# Patient Record
Sex: MALE | Race: WHITE | NOT HISPANIC OR LATINO | Employment: FULL TIME | URBAN - METROPOLITAN AREA
[De-identification: names, ages, dates, MRNs, and addresses within clinical notes are randomized per-mention and may not be internally consistent; named-entity substitution may affect disease eponyms.]

---

## 2019-12-01 ENCOUNTER — APPOINTMENT (EMERGENCY)
Dept: CT IMAGING | Facility: HOSPITAL | Age: 49
End: 2019-12-01
Payer: COMMERCIAL

## 2019-12-01 ENCOUNTER — HOSPITAL ENCOUNTER (EMERGENCY)
Facility: HOSPITAL | Age: 49
Discharge: HOME | End: 2019-12-01
Attending: EMERGENCY MEDICINE
Payer: COMMERCIAL

## 2019-12-01 ENCOUNTER — APPOINTMENT (EMERGENCY)
Dept: RADIOLOGY | Facility: HOSPITAL | Age: 49
End: 2019-12-01
Payer: COMMERCIAL

## 2019-12-01 VITALS
HEART RATE: 74 BPM | SYSTOLIC BLOOD PRESSURE: 135 MMHG | TEMPERATURE: 98 F | DIASTOLIC BLOOD PRESSURE: 66 MMHG | WEIGHT: 264.99 LBS | OXYGEN SATURATION: 96 % | RESPIRATION RATE: 16 BRPM

## 2019-12-01 DIAGNOSIS — R10.9 ABDOMINAL PAIN DUE TO INJURY: ICD-10-CM

## 2019-12-01 DIAGNOSIS — R07.9 CHEST PAIN IN ADULT: ICD-10-CM

## 2019-12-01 DIAGNOSIS — R74.01 TRANSAMINITIS: ICD-10-CM
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
## 2019-12-01 LAB
ALBUMIN SERPL BCP-MCNC: 4.1 G/DL (ref 3.5–5)
ALP SERPL-CCNC: 69 U/L (ref 46–116)
ALT SERPL W P-5'-P-CCNC: 86 U/L (ref 12–78)
ANION GAP SERPL CALCULATED.3IONS-SCNC: 6 MMOL/L (ref 4–13)
AST SERPL W P-5'-P-CCNC: 56 U/L (ref 5–45)
BASOPHILS # BLD AUTO: 0.03 THOUSANDS/ΜL (ref 0–0.1)
BASOPHILS NFR BLD AUTO: 1 % (ref 0–1)
BILIRUB SERPL-MCNC: 0.6 MG/DL (ref 0.2–1)
BUN SERPL-MCNC: 16 MG/DL (ref 5–25)
CALCIUM SERPL-MCNC: 9.3 MG/DL (ref 8.3–10.1)
CHLORIDE SERPL-SCNC: 103 MMOL/L (ref 100–108)
CO2 SERPL-SCNC: 28 MMOL/L (ref 21–32)
CREAT SERPL-MCNC: 0.87 MG/DL (ref 0.6–1.3)
EOSINOPHIL # BLD AUTO: 0.11 THOUSAND/ΜL (ref 0–0.61)
EOSINOPHIL NFR BLD AUTO: 2 % (ref 0–6)
ERYTHROCYTE [DISTWIDTH] IN BLOOD BY AUTOMATED COUNT: 11.9 % (ref 11.6–15.1)
GFR SERPL CREATININE-BSD FRML MDRD: 101 ML/MIN/1.73SQ M
GLUCOSE SERPL-MCNC: 133 MG/DL (ref 65–140)
HCT VFR BLD AUTO: 45.2 % (ref 36.5–49.3)
HGB BLD-MCNC: 15.6 G/DL (ref 12–17)
IMM GRANULOCYTES # BLD AUTO: 0.02 THOUSAND/UL (ref 0–0.2)
IMM GRANULOCYTES NFR BLD AUTO: 0 % (ref 0–2)
LIPASE SERPL-CCNC: 130 U/L (ref 73–393)
LYMPHOCYTES # BLD AUTO: 1.83 THOUSANDS/ΜL (ref 0.6–4.47)
LYMPHOCYTES NFR BLD AUTO: 32 % (ref 14–44)
MCH RBC QN AUTO: 31.9 PG (ref 26.8–34.3)
MCHC RBC AUTO-ENTMCNC: 34.5 G/DL (ref 31.4–37.4)
MCV RBC AUTO: 92 FL (ref 82–98)
MONOCYTES # BLD AUTO: 0.5 THOUSAND/ΜL (ref 0.17–1.22)
MONOCYTES NFR BLD AUTO: 9 % (ref 4–12)
NEUTROPHILS # BLD AUTO: 3.27 THOUSANDS/ΜL (ref 1.85–7.62)
NEUTS SEG NFR BLD AUTO: 56 % (ref 43–75)
NRBC BLD AUTO-RTO: 0 /100 WBCS
PLATELET # BLD AUTO: 225 THOUSANDS/UL (ref 149–390)
PMV BLD AUTO: 10.1 FL (ref 8.9–12.7)
POTASSIUM SERPL-SCNC: 3.7 MMOL/L (ref 3.5–5.3)
PROT SERPL-MCNC: 7.3 G/DL (ref 6.4–8.2)
RBC # BLD AUTO: 4.89 MILLION/UL (ref 3.88–5.62)
SODIUM SERPL-SCNC: 137 MMOL/L (ref 136–145)
TROPONIN I SERPL-MCNC: <0.02 NG/ML
WBC # BLD AUTO: 5.76 THOUSAND/UL (ref 4.31–10.16)

## 2019-12-01 PROCEDURE — 93005 ELECTROCARDIOGRAM TRACING: CPT

## 2019-12-01 PROCEDURE — 71260 CT THORAX DX C+: CPT

## 2019-12-01 PROCEDURE — 71046 X-RAY EXAM CHEST 2 VIEWS: CPT

## 2019-12-01 PROCEDURE — 80053 COMPREHEN METABOLIC PANEL: CPT | Performed by: EMERGENCY MEDICINE

## 2019-12-01 PROCEDURE — 74177 CT ABD & PELVIS W/CONTRAST: CPT

## 2019-12-01 PROCEDURE — 36415 COLL VENOUS BLD VENIPUNCTURE: CPT | Performed by: EMERGENCY MEDICINE

## 2019-12-01 PROCEDURE — 83690 ASSAY OF LIPASE: CPT | Performed by: EMERGENCY MEDICINE

## 2019-12-01 PROCEDURE — 85025 COMPLETE CBC W/AUTO DIFF WBC: CPT | Performed by: EMERGENCY MEDICINE

## 2019-12-01 PROCEDURE — 99285 EMERGENCY DEPT VISIT HI MDM: CPT | Performed by: EMERGENCY MEDICINE

## 2019-12-01 PROCEDURE — 84484 ASSAY OF TROPONIN QUANT: CPT | Performed by: EMERGENCY MEDICINE

## 2019-12-01 PROCEDURE — 99285 EMERGENCY DEPT VISIT HI MDM: CPT

## 2019-12-01 RX ADMIN — IOHEXOL 100 ML: 350 INJECTION, SOLUTION INTRAVENOUS at 17:51
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
## 2019-12-04 LAB
ATRIAL RATE: 72 BPM
P AXIS: 17 DEGREES
PR INTERVAL: 184 MS
QRS AXIS: -3 DEGREES
QRSD INTERVAL: 96 MS
QT INTERVAL: 392 MS
QTC INTERVAL: 420 MS
T WAVE AXIS: 93 DEGREES
VENTRICULAR RATE: 69 BPM

## 2019-12-04 PROCEDURE — 93010 ELECTROCARDIOGRAM REPORT: CPT | Performed by: INTERNAL MEDICINE

## 2020-08-05 ENCOUNTER — HOSPITAL ENCOUNTER (EMERGENCY)
Facility: HOSPITAL | Age: 50
Discharge: HOME | End: 2020-08-05
Attending: EMERGENCY MEDICINE
Payer: COMMERCIAL

## 2020-08-05 VITALS
SYSTOLIC BLOOD PRESSURE: 125 MMHG | HEART RATE: 82 BPM | RESPIRATION RATE: 18 BRPM | OXYGEN SATURATION: 95 % | WEIGHT: 245.37 LBS | TEMPERATURE: 98.5 F | DIASTOLIC BLOOD PRESSURE: 67 MMHG

## 2020-08-05 DIAGNOSIS — R11.0 NAUSEA: ICD-10-CM

## 2020-08-05 DIAGNOSIS — R53.1 GENERALIZED WEAKNESS: ICD-10-CM

## 2020-08-05 DIAGNOSIS — R10.84 GENERALIZED ABDOMINAL PAIN: Primary | ICD-10-CM

## 2020-08-05 DIAGNOSIS — R73.9 HYPERGLYCEMIA: ICD-10-CM

## 2020-08-05 LAB
ALBUMIN SERPL BCP-MCNC: 3.6 G/DL (ref 3.5–5)
ALP SERPL-CCNC: 87 U/L (ref 46–116)
ALT SERPL W P-5'-P-CCNC: 87 U/L (ref 12–78)
ANION GAP SERPL CALCULATED.3IONS-SCNC: 7 MMOL/L (ref 4–13)
AST SERPL W P-5'-P-CCNC: 61 U/L (ref 5–45)
BASOPHILS # BLD AUTO: 0.01 THOUSANDS/ΜL (ref 0–0.1)
BASOPHILS NFR BLD AUTO: 0 % (ref 0–1)
BILIRUB SERPL-MCNC: 0.66 MG/DL (ref 0.2–1)
BUN SERPL-MCNC: 13 MG/DL (ref 5–25)
CALCIUM SERPL-MCNC: 8.5 MG/DL (ref 8.3–10.1)
CHLORIDE SERPL-SCNC: 95 MMOL/L (ref 100–108)
CO2 SERPL-SCNC: 28 MMOL/L (ref 21–32)
CREAT SERPL-MCNC: 1.02 MG/DL (ref 0.6–1.3)
EOSINOPHIL # BLD AUTO: 0.01 THOUSAND/ΜL (ref 0–0.61)
EOSINOPHIL NFR BLD AUTO: 0 % (ref 0–6)
ERYTHROCYTE [DISTWIDTH] IN BLOOD BY AUTOMATED COUNT: 12.4 % (ref 11.6–15.1)
GFR SERPL CREATININE-BSD FRML MDRD: 85 ML/MIN/1.73SQ M
GLUCOSE SERPL-MCNC: 354 MG/DL (ref 65–140)
HCT VFR BLD AUTO: 47.3 % (ref 36.5–49.3)
HGB BLD-MCNC: 16 G/DL (ref 12–17)
IMM GRANULOCYTES # BLD AUTO: 0 THOUSAND/UL (ref 0–0.2)
IMM GRANULOCYTES NFR BLD AUTO: 0 % (ref 0–2)
LIPASE SERPL-CCNC: 133 U/L (ref 73–393)
LYMPHOCYTES # BLD AUTO: 1.01 THOUSANDS/ΜL (ref 0.6–4.47)
LYMPHOCYTES NFR BLD AUTO: 29 % (ref 14–44)
MCH RBC QN AUTO: 31.3 PG (ref 26.8–34.3)
MCHC RBC AUTO-ENTMCNC: 33.8 G/DL (ref 31.4–37.4)
MCV RBC AUTO: 93 FL (ref 82–98)
MONOCYTES # BLD AUTO: 0.25 THOUSAND/ΜL (ref 0.17–1.22)
MONOCYTES NFR BLD AUTO: 7 % (ref 4–12)
NEUTROPHILS # BLD AUTO: 2.16 THOUSANDS/ΜL (ref 1.85–7.62)
NEUTS SEG NFR BLD AUTO: 64 % (ref 43–75)
NRBC BLD AUTO-RTO: 0 /100 WBCS
PLATELET # BLD AUTO: 154 THOUSANDS/UL (ref 149–390)
PMV BLD AUTO: 10.2 FL (ref 8.9–12.7)
POTASSIUM SERPL-SCNC: 4.6 MMOL/L (ref 3.5–5.3)
PROT SERPL-MCNC: 7.3 G/DL (ref 6.4–8.2)
RBC # BLD AUTO: 5.11 MILLION/UL (ref 3.88–5.62)
SODIUM SERPL-SCNC: 130 MMOL/L (ref 136–145)
WBC # BLD AUTO: 3.44 THOUSAND/UL (ref 4.31–10.16)

## 2020-08-05 PROCEDURE — 99285 EMERGENCY DEPT VISIT HI MDM: CPT

## 2020-08-05 PROCEDURE — 85025 COMPLETE CBC W/AUTO DIFF WBC: CPT | Performed by: EMERGENCY MEDICINE

## 2020-08-05 PROCEDURE — 99284 EMERGENCY DEPT VISIT MOD MDM: CPT | Performed by: EMERGENCY MEDICINE

## 2020-08-05 PROCEDURE — 36415 COLL VENOUS BLD VENIPUNCTURE: CPT | Performed by: EMERGENCY MEDICINE

## 2020-08-05 PROCEDURE — 83690 ASSAY OF LIPASE: CPT | Performed by: EMERGENCY MEDICINE

## 2020-08-05 PROCEDURE — 96374 THER/PROPH/DIAG INJ IV PUSH: CPT

## 2020-08-05 PROCEDURE — 96361 HYDRATE IV INFUSION ADD-ON: CPT

## 2020-08-05 PROCEDURE — 80053 COMPREHEN METABOLIC PANEL: CPT | Performed by: EMERGENCY MEDICINE

## 2020-08-05 RX ORDER — DICYCLOMINE HCL 20 MG
20 TABLET ORAL 3 TIMES DAILY PRN
Qty: 20 TABLET | Refills: 0 | Status: SHIPPED | OUTPATIENT
Start: 2020-08-05

## 2020-08-05 RX ORDER — ONDANSETRON 2 MG/ML
4 INJECTION INTRAMUSCULAR; INTRAVENOUS ONCE
Status: COMPLETED | OUTPATIENT
Start: 2020-08-05 | End: 2020-08-05

## 2020-08-05 RX ORDER — ONDANSETRON 4 MG/1
4 TABLET, ORALLY DISINTEGRATING ORAL EVERY 6 HOURS PRN
Qty: 20 TABLET | Refills: 0 | Status: SHIPPED | OUTPATIENT
Start: 2020-08-05

## 2020-08-05 RX ORDER — DICYCLOMINE HCL 20 MG
20 TABLET ORAL ONCE
Status: COMPLETED | OUTPATIENT
Start: 2020-08-05 | End: 2020-08-05

## 2020-08-05 RX ADMIN — SODIUM CHLORIDE 1000 ML: 0.9 INJECTION, SOLUTION INTRAVENOUS at 11:05

## 2020-08-05 RX ADMIN — DICYCLOMINE HYDROCHLORIDE 20 MG: 20 TABLET ORAL at 11:08

## 2020-08-05 RX ADMIN — ONDANSETRON 4 MG: 2 INJECTION INTRAMUSCULAR; INTRAVENOUS at 11:07

## 2020-08-05 NOTE — ED PROVIDER NOTES
History  Chief Complaint   Patient presents with    Weakness - Generalized     pt reports generalized weakness, feeling drained and tired with minimal activity  reports that his stomach feels "curdly" Pt reports 2 loose stools per day and nausea  moist cough noted  has sweats, denies fevers     Patient is a 68-year-old male with a history of diabetes who presents with abdominal pain  Patient states that he has had generalized abdominal cramping for 1 week  He describes associated nausea and generalized weakness  He has had a couple of loose stools but denies diarrhea  He also denies fevers, chills, shortness of breath, chest pain or other complaints  He was around a co-worker with similar symptoms and believes he caught a stomach bug  Patient has not been taking any medications at home  His symptoms have persisted for 1 week which prompted his visit to the emergency department today  History provided by:  Patient  Abdominal Pain   Pain location:  Generalized  Pain quality: cramping    Pain radiates to:  Does not radiate  Duration:  1 week  Progression:  Unchanged  Chronicity:  New  Ineffective treatments:  None tried  Associated symptoms: nausea    Associated symptoms: no chest pain, no chills, no constipation, no cough, no diarrhea, no dysuria, no fever, no hematuria, no shortness of breath, no sore throat and no vomiting        Prior to Admission Medications   Prescriptions Last Dose Informant Patient Reported? Taking? METFORMIN HCL PO   Yes No   Sig: Take by mouth   aspirin 81 MG tablet   Yes No   Sig: Take 325 mg by mouth daily      Facility-Administered Medications: None       Past Medical History:   Diagnosis Date    DM (diabetes mellitus), type 2 ([facility] [address] )        Past Surgical History:   Procedure Laterality Date    CARDIAC SURGERY         History reviewed  No pertinent family history  I have reviewed and agree with the history as documented      E-Cigarette/Vaping     E-Cigarette/Vaping Substances     Social History     Tobacco Use    Smoking status: Never Smoker    Smokeless tobacco: Never Used   Substance Use Topics    Alcohol use: Never     Frequency: Never    Drug use: Never       Review of Systems   Constitutional: Negative for chills, diaphoresis and fever  HENT: Negative for nosebleeds, sore throat and trouble swallowing  Eyes: Negative for photophobia, pain and visual disturbance  Respiratory: Negative for cough, chest tightness and shortness of breath  Cardiovascular: Negative for chest pain, palpitations and leg swelling  Gastrointestinal: Positive for abdominal pain and nausea  Negative for constipation, diarrhea and vomiting  Endocrine: Negative for polydipsia and polyuria  Genitourinary: Negative for difficulty urinating, dysuria and hematuria  Musculoskeletal: Negative for back pain, neck pain and neck stiffness  Skin: Negative for pallor and rash  Neurological: Negative for dizziness, syncope, light-headedness and headaches  All other systems reviewed and are negative  Physical Exam  Physical Exam  Vitals signs and nursing note reviewed  Constitutional:       General: He is not in acute distress  Appearance: He is well-developed  HENT:      Head: Normocephalic and atraumatic  Eyes:      Pupils: Pupils are equal, round, and reactive to light  Neck:      Musculoskeletal: Normal range of motion and neck supple  Cardiovascular:      Rate and Rhythm: Normal rate and regular rhythm  Pulses: Normal pulses  Heart sounds: Normal heart sounds  Pulmonary:      Effort: Pulmonary effort is normal  No respiratory distress  Breath sounds: Normal breath sounds  Abdominal:      General: There is no distension  Palpations: Abdomen is soft  Abdomen is not rigid  Tenderness: There is no abdominal tenderness  There is no guarding or rebound  Musculoskeletal: Normal range of motion  General: No tenderness  Lymphadenopathy:      Cervical: No cervical adenopathy  Skin:     General: Skin is warm and dry  Capillary Refill: Capillary refill takes less than 2 seconds  Neurological:      Mental Status: He is alert and oriented to person, place, and time  Cranial Nerves: No cranial nerve deficit  Sensory: No sensory deficit           Vital Signs  ED Triage Vitals [08/05/20 1041]   Temperature Pulse Respirations Blood Pressure SpO2   98 5 °F (36 9 °C) 82 18 125/67 95 %      Temp Source Heart Rate Source Patient Position - Orthostatic VS BP Location FiO2 (%)   Oral Monitor -- Right arm --      Pain Score       --           Vitals:    08/05/20 1041   BP: 125/67   Pulse: 82         Visual Acuity      ED Medications  Medications   sodium chloride 0 9 % bolus 1,000 mL (0 mL Intravenous Stopped 8/5/20 1320)   ondansetron (ZOFRAN) injection 4 mg (4 mg Intravenous Given 8/5/20 1107)   dicyclomine (BENTYL) tablet 20 mg (20 mg Oral Given 8/5/20 1108)       Diagnostic Studies  Results Reviewed     Procedure Component Value Units Date/Time    Comprehensive metabolic panel [[id number]]  (Abnormal) Collected:  08/05/20 1104    Lab Status:  Final result Specimen:  Blood from Arm, Right Updated:  08/05/20 1129     Sodium 130 mmol/L      Potassium 4 6 mmol/L      Chloride 95 mmol/L      CO2 28 mmol/L      ANION GAP 7 mmol/L      BUN 13 mg/dL      Creatinine 1 02 mg/dL      Glucose 354 mg/dL      Calcium 8 5 mg/dL      AST 61 U/L      ALT 87 U/L      Alkaline Phosphatase 87 U/L      Total Protein 7 3 g/dL      Albumin 3 6 g/dL      Total Bilirubin 0 66 mg/dL      eGFR 85 ml/min/1 73sq m     Narrative:       [place] guidelines for Chronic Kidney Disease (CKD):     Stage 1 with normal or high GFR (GFR > 90 mL/min/1 73 square meters)    Stage 2 Mild CKD (GFR = 60-89 mL/min/1 73 square meters)    Stage 3A Moderate CKD (GFR = 45-59 mL/min/1 73 square meters)    Stage 3B Moderate CKD (GFR = 30-44 mL/min/1 73 square meters)    Stage 4 Severe CKD (GFR = 15-29 mL/min/1 73 square meters)    Stage 5 End Stage CKD (GFR <15 mL/min/1 73 square meters)  Note: GFR calculation is accurate only with a steady state creatinine    Lipase [[id number]]  (Normal) Collected:  08/05/20 1104    Lab Status:  Final result Specimen:  Blood from Arm, Right Updated:  08/05/20 1129     Lipase 133 u/L     CBC and differential [[id number]]  (Abnormal) Collected:  08/05/20 1104    Lab Status:  Final result Specimen:  Blood from Arm, Right Updated:  08/05/20 1115     WBC 3 44 Thousand/uL      RBC 5 11 Million/uL      Hemoglobin 16 0 g/dL      Hematocrit 47 3 %      MCV 93 fL      MCH 31 3 pg      MCHC 33 8 g/dL      RDW 12 4 %      MPV 10 2 fL      Platelets 650 Thousands/uL      nRBC 0 /100 WBCs      Neutrophils Relative 64 %      Immat GRANS % 0 %      Lymphocytes Relative 29 %      Monocytes Relative 7 %      Eosinophils Relative 0 %      Basophils Relative 0 %      Neutrophils Absolute 2 16 Thousands/µL      Immature Grans Absolute 0 00 Thousand/uL      Lymphocytes Absolute 1 01 Thousands/µL      Monocytes Absolute 0 25 Thousand/µL      Eosinophils Absolute 0 01 Thousand/µL      Basophils Absolute 0 01 Thousands/µL                  No orders to display              Procedures  Procedures         ED Course  ED Course as of Aug 05 1504   Wed Aug 05, 2020   1132 Transaminitis similar to previous  Patient has hyperglycemia without evidence of DKA  1145 Patient feels well  Will reassess  US AUDIT      Most Recent Value   Initial Alcohol Screen: US AUDIT-C    1  How often do you have a drink containing alcohol?  0 Filed at: 08/05/2020 1109   2  How many drinks containing alcohol do you have on a typical day you are drinking? 0 Filed at: 08/05/2020 1109   3a  Male UNDER 65: How often do you have five or more drinks on one occasion? 0 Filed at: 08/05/2020 1109   3b  FEMALE Any Age, or MALE 65+:  How often do you have 4 or more drinks on one occassion? 0 Filed at: 08/05/2020 1109   Audit-C Score  0 Filed at: 08/05/2020 1109                  ELIE/DAST-10      Most Recent Value   How many times in the past year have you    Used an illegal drug or used a prescription medication for non-medical reasons? Never Filed at: 08/05/2020 1109                                MDM  Number of Diagnoses or Management Options  Generalized abdominal pain: new and requires workup  Generalized weakness: new and requires workup  Hyperglycemia: new and requires workup  Nausea: new and requires workup  Diagnosis management comments: Patient presents with generalized abdominal discomfort, nausea and weakness  Abdominal exam benign  Possible gastroenteritis given nausea and loose stools  No respiratory symptoms  Patient is afebrile  Patient is very well-appearing  Do not suspect acute surgical process including but not limited to acute cholecystitis, acute appendicitis, SBO, mesenteric ischemia, AAA, vascular dissection, vascular occlusion, perforated viscus, testicular torsion  Do not suspect intrathoracic cause of abdominal pain  Symptoms improved and patient is tolerating po  Patient advised to return to ED if symptoms worsen or persist  Patient also advised to follow up with PCP          Amount and/or Complexity of Data Reviewed  Clinical lab tests: ordered and reviewed  Tests in the medicine section of CPT®: ordered and reviewed  Review and summarize past medical records: yes  Independent visualization of images, tracings, or specimens: yes    Risk of Complications, Morbidity, and/or Mortality  Presenting problems: high  Diagnostic procedures: moderate  Management options: moderate    Patient Progress  Patient progress: stable        Disposition  Final diagnoses:   Generalized abdominal pain   Nausea   Generalized weakness   Hyperglycemia     Time reflects when diagnosis was documented in both MDM as applicable and the Disposition within this note Time User Action Codes Description Comment    8/5/2020  1:12 PM Adrienne Gone Add [R10 84] Generalized abdominal pain     8/5/2020  1:12 PM Eluterio Dukes L Add [R11 0] Nausea     8/5/2020  1:12 PM Eluterio Dukes L Add [R53 1] Generalized weakness     8/5/2020  1:12 PM Adrienne Gone Add [R73 9] Hyperglycemia       ED Disposition     ED Disposition Condition Date/Time Comment    Discharge Stable Wed Aug 5, 2020  1:12 PM Tyra Willian discharge to home/self care  Follow-up Information     Follow up With Specialties Details Why Contact Info    [employer]  Schedule an appointment as soon as possible for a visit  Return to ED sooner if symptoms worsen or persist  [phone]            Discharge Medication List as of 8/5/2020  1:14 PM      START taking these medications    Details   dicyclomine (BENTYL) 20 mg tablet Take 1 tablet (20 mg total) by mouth 3 (three) times a day as needed (Abdominal cramping), Starting Wed 8/5/2020, Normal      ondansetron (ZOFRAN-ODT) 4 mg disintegrating tablet Take 1 tablet (4 mg total) by mouth every 6 (six) hours as needed for nausea or vomiting, Starting Wed 8/5/2020, Normal         CONTINUE these medications which have NOT CHANGED    Details   aspirin 81 MG tablet Take 325 mg by mouth daily, Historical Med      METFORMIN HCL PO Take by mouth, Historical Med           No discharge procedures on file      PDMP Review     None          ED Provider  Electronically Signed by           Huang Esquivel DO  08/05/20 3669

## 2021-06-14 ENCOUNTER — APPOINTMENT (EMERGENCY)
Dept: RADIOLOGY | Facility: HOSPITAL | Age: 51
End: 2021-06-14

## 2021-06-14 ENCOUNTER — HOSPITAL ENCOUNTER (EMERGENCY)
Facility: HOSPITAL | Age: 51
Discharge: HOME | End: 2021-06-14
Attending: EMERGENCY MEDICINE

## 2021-06-14 VITALS
OXYGEN SATURATION: 98 % | SYSTOLIC BLOOD PRESSURE: 145 MMHG | DIASTOLIC BLOOD PRESSURE: 66 MMHG | HEART RATE: 69 BPM | RESPIRATION RATE: 18 BRPM | TEMPERATURE: 98.7 F

## 2021-06-14 DIAGNOSIS — M54.32 SCIATICA OF LEFT SIDE: Primary | ICD-10-CM

## 2021-06-14 PROCEDURE — 99284 EMERGENCY DEPT VISIT MOD MDM: CPT | Performed by: PHYSICIAN ASSISTANT

## 2021-06-14 PROCEDURE — 99283 EMERGENCY DEPT VISIT LOW MDM: CPT

## 2021-06-14 PROCEDURE — 72100 X-RAY EXAM L-S SPINE 2/3 VWS: CPT

## 2021-06-14 RX ORDER — LIDOCAINE 50 MG/G
1 PATCH TOPICAL ONCE
Status: DISCONTINUED | OUTPATIENT
Start: 2021-06-14 | End: 2021-06-14 | Stop reason: HOSPADM

## 2021-06-14 RX ORDER — CYCLOBENZAPRINE HCL 10 MG
10 TABLET ORAL 2 TIMES DAILY PRN
Qty: 20 TABLET | Refills: 0 | Status: SHIPPED | OUTPATIENT
Start: 2021-06-14

## 2021-06-14 RX ADMIN — LIDOCAINE 5% 1 PATCH: 700 PATCH TOPICAL at 11:40

## 2021-06-14 NOTE — ED PROVIDER NOTES
History  Chief Complaint   Patient presents with    Back Pain     pt injured his back 8 days ago lifting something heavy     Patient states that he works for [employer], states that he was lifting and twisting and states that has had low back pain radiating down the left leg to the left knee  No weakness  Improvement with ibuprofen but then returns  No falls  No previous injury/surgery noted  Back Pain  Location:  Lumbar spine  Quality:  Aching  Radiates to:  L knee  Pain severity:  Moderate  Pain is:  Same all the time  Onset quality:  Gradual  Duration:  8 days  Timing:  Intermittent  Progression:  Waxing and waning  Chronicity:  New  Context: lifting heavy objects and twisting    Relieved by:  Ibuprofen  Exacerbated by: staying in one place for a long period of time  Ineffective treatments:  Being still  Associated symptoms: no abdominal pain, no bladder incontinence, no bowel incontinence, no chest pain, no dysuria, no fever, no numbness, no paresthesias, no perianal numbness, no tingling and no weakness    Risk factors: obesity        Prior to Admission Medications   Prescriptions Last Dose Informant Patient Reported? Taking? METFORMIN HCL PO   Yes No   Sig: Take by mouth   aspirin 81 MG tablet   Yes No   Sig: Take 325 mg by mouth daily   dicyclomine (BENTYL) 20 mg tablet   No No   Sig: Take 1 tablet (20 mg total) by mouth 3 (three) times a day as needed (Abdominal cramping)   ondansetron (ZOFRAN-ODT) 4 mg disintegrating tablet   No No   Sig: Take 1 tablet (4 mg total) by mouth every 6 (six) hours as needed for nausea or vomiting      Facility-Administered Medications: None       Past Medical History:   Diagnosis Date    DM (diabetes mellitus), type 2 ([facility] [address] )        Past Surgical History:   Procedure Laterality Date    CARDIAC SURGERY         History reviewed  No pertinent family history  I have reviewed and agree with the history as documented      E-Cigarette/Vaping    E-Cigarette Use Never User E-Cigarette/Vaping Substances     Social History     Tobacco Use    Smoking status: Never Smoker    Smokeless tobacco: Never Used   Vaping Use    Vaping Use: Never used   Substance Use Topics    Alcohol use: Never    Drug use: Never       Review of Systems   Constitutional: Negative for chills and fever  HENT: Negative for ear pain and sore throat  Eyes: Negative for pain and visual disturbance  Respiratory: Negative for cough and shortness of breath  Cardiovascular: Negative for chest pain and palpitations  Gastrointestinal: Negative for abdominal pain, bowel incontinence and vomiting  Genitourinary: Negative for bladder incontinence, dysuria and hematuria  Musculoskeletal: Positive for back pain  Negative for arthralgias  Skin: Negative for color change and rash  Neurological: Negative for tingling, seizures, syncope, weakness, numbness and paresthesias  All other systems reviewed and are negative  Physical Exam  Physical Exam  Vitals and nursing note reviewed  Constitutional:       Appearance: Normal appearance  Cardiovascular:      Rate and Rhythm: Normal rate and regular rhythm  Pulses: Normal pulses  Heart sounds: Normal heart sounds  Pulmonary:      Breath sounds: Normal breath sounds  Abdominal:      General: Abdomen is flat  There is no distension  Palpations: Abdomen is soft  Tenderness: There is no abdominal tenderness  Musculoskeletal:         General: Tenderness (left paracervical) present  Normal range of motion  Skin:     General: Skin is warm and dry  Neurological:      General: No focal deficit present  Mental Status: He is alert and oriented to person, place, and time  Sensory: No sensory deficit        Coordination: Coordination normal       Gait: Gait normal       Deep Tendon Reflexes: Reflexes normal          Vital Signs  ED Triage Vitals   Temperature Pulse Respirations Blood Pressure SpO2   06/14/21 1108 06/14/21 1107 06/14/21 1107 06/14/21 1107 06/14/21 1107   98 7 °F (37 1 °C) 69 18 145/66 98 %      Temp Source Heart Rate Source Patient Position - Orthostatic VS BP Location FiO2 (%)   06/14/21 1108 06/14/21 1107 06/14/21 1107 06/14/21 1107 --   Oral Monitor Sitting Left arm       Pain Score       06/14/21 1107       No Pain           Vitals:    06/14/21 1107   BP: 145/66   Pulse: 69   Patient Position - Orthostatic VS: Sitting         Visual Acuity      ED Medications  Medications   lidocaine (LIDODERM) 5 % patch 1 patch (1 patch Topical Medication Applied 6/14/21 1140)       Diagnostic Studies  Results Reviewed     None                 XR spine lumbar 2 or 3 views injury   ED Interpretation by Kusum Chapin PA-C (06/14 2403)   No acute abnormalities noted                 Procedures  Procedures         ED Course                                           MDM  Number of Diagnoses or Management Options  Sciatica of left side  Diagnosis management comments: Differential diagnosis includes but is not limited to: sprain, strain, herniation, sciatica    The patient was seen and examined    Imaging revealed no evidence of acute injury  The patient was treated with lidoderm patch in the ED  The patient was re-examined after treatment and disposition of discharge to home with cyclobenzaprine was made  The test results were discussed with the patient/family  All questions were answered to patient/family's satisfaction  Anticipatory guidance and return precautions were discussed at length  They verbalized understanding and agreement with the plan  The patient remained stable while under my care in the Emergency Department              Amount and/or Complexity of Data Reviewed  Tests in the radiology section of CPT®: ordered and reviewed  Independent visualization of images, tracings, or specimens: yes    Risk of Complications, Morbidity, and/or Mortality  Presenting problems: moderate  Diagnostic procedures: low  Management options: low    Patient Progress  Patient progress: improved      Disposition  Final diagnoses:   Sciatica of left side     Time reflects when diagnosis was documented in both MDM as applicable and the Disposition within this note     Time User Action Codes Description Comment    6/14/2021 12:43 PM Yadi Markham Add [M54 [id number]] Sciatica of left side       ED Disposition     ED Disposition Condition Date/Time Comment    Discharge Stable Mon Jun 14, 2021 12:43 PM Yelenajohana Cas discharge to home/self care  Follow-up Information     Follow up With Specialties Details Why Contact Info Additional Information    [address] [facility] Emergency Medicine  If symptoms worsen [address] [facility] [address] [facility] Emergency Department, [address], [place], [place], [address]          Discharge Medication List as of 6/14/2021 12:50 PM      START taking these medications    Details   cyclobenzaprine (FLEXERIL) 10 mg tablet Take 1 tablet (10 mg total) by mouth 2 (two) times a day as needed for muscle spasms, Starting Mon 6/14/2021, Normal         CONTINUE these medications which have NOT CHANGED    Details   aspirin 81 MG tablet Take 325 mg by mouth daily, Historical Med      dicyclomine (BENTYL) 20 mg tablet Take 1 tablet (20 mg total) by mouth 3 (three) times a day as needed (Abdominal cramping), Starting Wed 8/5/2020, Normal      METFORMIN HCL PO Take by mouth, Historical Med      ondansetron (ZOFRAN-ODT) 4 mg disintegrating tablet Take 1 tablet (4 mg total) by mouth every 6 (six) hours as needed for nausea or vomiting, Starting Wed 8/5/2020, Normal           No discharge procedures on file      PDMP Review     None          ED Provider  Electronically Signed by           Shira White PA-C  06/14/21 1932

## 2021-06-14 NOTE — DISCHARGE INSTRUCTIONS
XR spine lumbar 2 or 3 views injury   ED Interpretation   No acute abnormalities noted        Continue using motrin as needed with food in addition to muscle relaxer   Heat and massage as tolerated

## 2021-06-14 NOTE — ED NOTES
Patient reports injuring back approximately 1 week ago  Motrin helps, but is not enough  Patient reports taking up to 5 pills at a time for pain so he can continue to work        Vicky Wynn RN  06/14/21 0629

## 2021-11-28 ENCOUNTER — APPOINTMENT (EMERGENCY)
Dept: RADIOLOGY | Facility: HOSPITAL | Age: 51
End: 2021-11-28
Payer: OTHER MISCELLANEOUS

## 2021-11-28 ENCOUNTER — HOSPITAL ENCOUNTER (EMERGENCY)
Facility: HOSPITAL | Age: 51
Discharge: HOME | End: 2021-11-28
Attending: EMERGENCY MEDICINE | Admitting: EMERGENCY MEDICINE
Payer: OTHER MISCELLANEOUS

## 2021-11-28 VITALS
WEIGHT: 250 LBS | RESPIRATION RATE: 16 BRPM | HEART RATE: 83 BPM | HEIGHT: 68 IN | DIASTOLIC BLOOD PRESSURE: 64 MMHG | BODY MASS INDEX: 37.89 KG/M2 | SYSTOLIC BLOOD PRESSURE: 138 MMHG | TEMPERATURE: 98.5 F | OXYGEN SATURATION: 95 %

## 2021-11-28 DIAGNOSIS — S52.291B: Primary | ICD-10-CM

## 2021-11-28 PROCEDURE — 73090 X-RAY EXAM OF FOREARM: CPT

## 2021-11-28 PROCEDURE — 99283 EMERGENCY DEPT VISIT LOW MDM: CPT

## 2021-11-28 PROCEDURE — 99283 EMERGENCY DEPT VISIT LOW MDM: CPT | Performed by: EMERGENCY MEDICINE

## 2021-12-02 ENCOUNTER — HOSPITAL ENCOUNTER (OUTPATIENT)
Dept: MRI IMAGING | Facility: HOSPITAL | Age: 51
Discharge: HOME | End: 2021-12-02
Payer: OTHER MISCELLANEOUS

## 2021-12-02 ENCOUNTER — OFFICE VISIT (OUTPATIENT)
Dept: OBGYN CLINIC | Facility: CLINIC | Age: 51
End: 2021-12-02
Payer: OTHER MISCELLANEOUS

## 2021-12-02 VITALS — BODY MASS INDEX: 41.28 KG/M2 | HEIGHT: 68 IN | WEIGHT: 272.4 LBS

## 2021-12-02 DIAGNOSIS — M25.521 PAIN IN RIGHT ELBOW: ICD-10-CM

## 2021-12-02 DIAGNOSIS — M25.521 PAIN IN RIGHT ELBOW: Primary | ICD-10-CM

## 2021-12-02 DIAGNOSIS — S46.209A INJURY OF TENDON OF BICEPS: ICD-10-CM

## 2021-12-02 DIAGNOSIS — F40.240 CLAUSTROPHOBIA: ICD-10-CM

## 2021-12-02 PROCEDURE — 99203 OFFICE O/P NEW LOW 30 MIN: CPT | Performed by: PHYSICIAN ASSISTANT

## 2021-12-02 PROCEDURE — 73221 MRI JOINT UPR EXTREM W/O DYE: CPT

## 2021-12-02 PROCEDURE — G1004 CDSM NDSC: HCPCS

## 2021-12-02 RX ORDER — LORAZEPAM 0.5 MG/1
0.5 TABLET ORAL ONCE
Qty: 2 TABLET | Refills: 0 | Status: SHIPPED | OUTPATIENT
Start: 2021-12-02 | End: 2021-12-02

## 2021-12-09 ENCOUNTER — OFFICE VISIT (OUTPATIENT)
Dept: OBGYN CLINIC | Facility: OTHER | Age: 51
End: 2021-12-09
Payer: OTHER MISCELLANEOUS

## 2021-12-09 VITALS
BODY MASS INDEX: 41.22 KG/M2 | WEIGHT: 272 LBS | HEART RATE: 86 BPM | SYSTOLIC BLOOD PRESSURE: 156 MMHG | HEIGHT: 68 IN | DIASTOLIC BLOOD PRESSURE: 90 MMHG

## 2021-12-09 DIAGNOSIS — S46.211A STRAIN OF RIGHT BICEPS, INITIAL ENCOUNTER: Primary | ICD-10-CM

## 2021-12-09 DIAGNOSIS — S56.519A STRAIN OF EXTENSOR MUSCLE AT FOREARM LEVEL: ICD-10-CM

## 2021-12-09 PROCEDURE — 99214 OFFICE O/P EST MOD 30 MIN: CPT | Performed by: ORTHOPAEDIC SURGERY

## 2022-02-20 ENCOUNTER — HOSPITAL ENCOUNTER (EMERGENCY)
Facility: HOSPITAL | Age: 52
Discharge: HOME | End: 2022-02-20
Attending: EMERGENCY MEDICINE | Admitting: EMERGENCY MEDICINE
Payer: COMMERCIAL

## 2022-02-20 VITALS
HEART RATE: 83 BPM | TEMPERATURE: 100.2 F | SYSTOLIC BLOOD PRESSURE: 180 MMHG | OXYGEN SATURATION: 98 % | RESPIRATION RATE: 20 BRPM | DIASTOLIC BLOOD PRESSURE: 78 MMHG

## 2022-02-20 DIAGNOSIS — L03.114 CELLULITIS OF LEFT UPPER EXTREMITY: Primary | ICD-10-CM

## 2022-02-20 PROCEDURE — 99284 EMERGENCY DEPT VISIT MOD MDM: CPT | Performed by: EMERGENCY MEDICINE

## 2022-02-20 PROCEDURE — 99282 EMERGENCY DEPT VISIT SF MDM: CPT

## 2022-02-20 RX ORDER — CLINDAMYCIN HYDROCHLORIDE 150 MG/1
450 CAPSULE ORAL 3 TIMES DAILY
Qty: 89 CAPSULE | Refills: 0 | Status: SHIPPED | OUTPATIENT
Start: 2022-02-20 | End: 2022-03-02

## 2022-02-20 RX ORDER — CLINDAMYCIN HYDROCHLORIDE 150 MG/1
450 CAPSULE ORAL ONCE
Status: COMPLETED | OUTPATIENT
Start: 2022-02-20 | End: 2022-02-20

## 2022-02-20 RX ADMIN — CLINDAMYCIN HYDROCHLORIDE 450 MG: 150 CAPSULE ORAL at 15:22

## 2022-02-20 NOTE — ED PROVIDER NOTES
History  Chief Complaint   Patient presents with    Wound Check     Pt reports L elbow boil for 2 weeks that has become worse  - fever, +drainge, +edema      Ki Wharton is a 78-year-old male with PMH of CHF, DM, quadruple bypass surgery 7 years ago, hypertension that presents the ED with 2 week history of left-sided forearm boil that has been worsening  Patient states that 2 days ago it became really hot and painful and he placed an ice pack on the wound which helped the pain significantly  The patient attempted to squeeze the area previously without any drainage  The patient has felt the fatigue starting yesterday and today and has felt some chills  Temperature was 100 2° upon arrival   He states that there is no pain in the wound at rest and that he has no pain whatsoever with bending of the left elbow  He gets mild pain when he bumps the area  He reports that today when he was walking by fence he bumped the area on the fence and a small portion of the wound opened  There has been no purulence drainage from the wound  The patient denies any headache, chest pain, abdominal pain, bowel or bladder symptoms  Prior to Admission Medications   Prescriptions Last Dose Informant Patient Reported? Taking? LORazepam (Ativan) 0 5 mg tablet   No No   Sig: Take 1 tablet (0 5 mg total) by mouth 1 (one) time for 1 dose Take one the morning of the MRI  May repeat x1 30 minutes before the MRI if needed     METFORMIN HCL PO   Yes No   Sig: Take by mouth   aspirin 81 MG tablet   Yes No   Sig: Take 325 mg by mouth daily   cyclobenzaprine (FLEXERIL) 10 mg tablet   No No   Sig: Take 1 tablet (10 mg total) by mouth 2 (two) times a day as needed for muscle spasms   dicyclomine (BENTYL) 20 mg tablet   No No   Sig: Take 1 tablet (20 mg total) by mouth 3 (three) times a day as needed (Abdominal cramping)   ondansetron (ZOFRAN-ODT) 4 mg disintegrating tablet   No No   Sig: Take 1 tablet (4 mg total) by mouth every 6 (six) hours as needed for nausea or vomiting      Facility-Administered Medications: None       Past Medical History:   Diagnosis Date    Coronary artery disease     DM (diabetes mellitus), type 2 ([facility] [address] )     Hypertension        Past Surgical History:   Procedure Laterality Date    CARDIAC SURGERY      CORONARY ARTERY BYPASS GRAFT Right 11/28/2021       History reviewed  No pertinent family history  I have reviewed and agree with the history as documented  E-Cigarette/Vaping    E-Cigarette Use Never User      E-Cigarette/Vaping Substances    Nicotine No     THC No     CBD No     Flavoring No     Other No     Unknown No      Social History     Tobacco Use    Smoking status: Never Smoker    Smokeless tobacco: Never Used   Vaping Use    Vaping Use: Never used   Substance Use Topics    Alcohol use: Never    Drug use: Never        Review of Systems   Constitutional: Positive for chills and fatigue  Negative for fever  HENT: Negative for ear pain and sore throat  Eyes: Negative for pain and visual disturbance  Respiratory: Negative for cough and shortness of breath  Cardiovascular: Negative for chest pain and palpitations  Gastrointestinal: Negative for abdominal pain, constipation, diarrhea, nausea and vomiting  Genitourinary: Negative for dysuria and hematuria  Musculoskeletal: Negative for arthralgias and back pain  Left forearm redness and swelling with minimal pain   Skin: Negative for color change and rash  Neurological: Negative for seizures and syncope  All other systems reviewed and are negative        Physical Exam  ED Triage Vitals   Temperature Pulse Respirations Blood Pressure SpO2   02/20/22 1353 02/20/22 1351 02/20/22 1351 02/20/22 1351 02/20/22 1351   100 2 °F (37 9 °C) 83 20 (!) 180/78 98 %      Temp Source Heart Rate Source Patient Position - Orthostatic VS BP Location FiO2 (%)   02/20/22 1353 02/20/22 1351 -- -- --   Oral Monitor         Pain Score       -- Orthostatic Vital Signs  Vitals:    02/20/22 1351   BP: (!) 180/78   Pulse: 83       Physical Exam  Vitals and nursing note reviewed  Constitutional:       General: He is not in acute distress  Appearance: He is well-developed  He is not ill-appearing  HENT:      Head: Normocephalic and atraumatic  Left Ear: External ear normal    Eyes:      General:         Right eye: No discharge  Left eye: No discharge  Extraocular Movements: Extraocular movements intact  Conjunctiva/sclera: Conjunctivae normal    Cardiovascular:      Rate and Rhythm: Normal rate and regular rhythm  Pulses: Normal pulses  Heart sounds: Normal heart sounds  No murmur heard  Pulmonary:      Effort: Pulmonary effort is normal  No respiratory distress  Breath sounds: Normal breath sounds  No wheezing, rhonchi or rales  Abdominal:      General: Abdomen is flat  Palpations: Abdomen is soft  Tenderness: There is no abdominal tenderness  There is no guarding or rebound  Musculoskeletal:         General: Normal range of motion  Right upper arm: Normal         Arms:       Cervical back: Normal range of motion and neck supple  Right lower leg: No edema  Left lower leg: No edema  Skin:     General: Skin is warm and dry  Capillary Refill: Capillary refill takes less than 2 seconds  Neurological:      Mental Status: He is alert           ED Medications  Medications   clindamycin (CLEOCIN) capsule 450 mg (450 mg Oral Given 2/20/22 1522)       Diagnostic Studies  Results Reviewed     None                 No orders to display         Procedures  Procedures      ED Course                                       MDM  Number of Diagnoses or Management Options  Cellulitis of left upper extremity  Diagnosis management comments: 51M with PMH of DM, hypertension, CHF, quadruple bypass 7 years ago presents the ED with 2 week history of left arm pain and swelling that "started with a boil and got worse"  On physical exam the patient has erythema, warmth, swelling of the left, posterior forearm with no fluctuance  Evaluation of the area with bedside ultrasound reveals cellulitis but no pockets of abscess  Without pain in the joint, series systemic signs of illness, or areas for drainage the patient is appropriate for treatment with antibiotics outpatient  One dose of clindamycin is administered to the patient due to allergy to penicillin  Patient is prescribed penicillin outpatient and strict return precautions are discussed with the patient  The patient demonstrates understanding of the plan and agrees to return to the emergency department if symptoms worsen  Patient is discharged home  Disposition  Final diagnoses:   Cellulitis of left upper extremity     Time reflects when diagnosis was documented in both MDM as applicable and the Disposition within this note     Time User Action Codes Description Comment    2/20/2022  2:42 PM Shelby Crabtree Add [N30 [id number]] Cellulitis of left upper extremity       ED Disposition     ED Disposition Condition Date/Time Comment    Discharge Stable Sun Feb 20, 2022  2:41 PM Jabier Constantino discharge to home/self care  Follow-up Information     Follow up With Specialties Details Why Contact Info    Jessica Asher, DO Family Medicine Call  If symptoms worsen or do not improve within the next week [address] [place] [address]  [phone]            Patient's Medications   Discharge Prescriptions    CLINDAMYCIN (CLEOCIN) 150 MG CAPSULE    Take 3 capsules (450 mg total) by mouth 3 (three) times a day for 10 days       Start Date: 2/20/2022 End Date: 3/2/2022       Order Dose: 450 mg       Quantity: 89 capsule    Refills: 0     No discharge procedures on file      PDMP Review       Value Time User    PDMP Reviewed  Yes 12/2/2021  8:49 AM Jet Alvarado PA-C           ED Provider  Attending physically available and evaluated Bernadette Masoud Maria Del Carmen Modi I managed the patient along with the ED Attending      Electronically Signed by         Devika Knutson DO  02/20/22 7117

## 2022-02-20 NOTE — ED ATTENDING ATTESTATION
2/20/2022  IMorris MD, saw and evaluated the patient  I have discussed the patient with the resident/non-physician practitioner and agree with the resident's/non-physician practitioner's findings, Plan of Care, and MDM as documented in the resident's/non-physician practitioner's note, except where noted  All available labs and Radiology studies were reviewed  I was present for key portions of any procedure(s) performed by the resident/non-physician practitioner and I was immediately available to provide assistance  At this point I agree with the current assessment done in the Emergency Department  I have conducted an independent evaluation of this patient a history and physical is as follows:    40-year-old male presented for evaluation increased swelling, erythema of his left arm over the last 2 weeks  Reports that today he also brushed against a fence at work  Works as a [employer]   States 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 and may have developed some irritation secondary to that  He has been using some ice packs with some improvement  Has a low-grade temperature on arrival here  Otherwise normal vitals  He reports some fatigue over the last 2 days but is still able to work without difficulty  Denies any difficulty breathing, chest pain, axillary pain or difficulty using the arm  No pain with range of motion of the elbow  On exam he has an area of cellulitis with small abrasion over the dorsal/lateral aspect of proximal forearm and elbow  No active drainage  There is no fluctuance  Bedside ultrasound shows no abscess but significant for cobblestoning of the tissue consistent with cellulitis  Cellulitis is not circumferential and is not progressing up to the proximal arm  He has no palpable epitrochlear node or axillary nodes  Advised returning to the ED if his symptoms worsen  Has penicillin allergy  Will treat with clindamycin        ED Course         Critical Care Time  Procedures

## 2022-02-20 NOTE — DISCHARGE INSTRUCTIONS
- Take 3 tablets of clindamycin 3 times a day for the next 10 days   - Return to the emergency department if you develop severe pain and swelling, especially with movement of the elbow  - Check the wound daily for worsening of redness and swelling    Keep the open part of the wound clean and dry

## 2022-02-20 NOTE — ED NOTES
Reddened area around boil on L elbow outlined with skin marker         Marito Bridges RN  02/20/22 6346

## 2023-04-07 ENCOUNTER — OFFICE VISIT (OUTPATIENT)
Dept: PODIATRY | Facility: CLINIC | Age: 53
End: 2023-04-07

## 2023-04-07 ENCOUNTER — APPOINTMENT (OUTPATIENT)
Dept: RADIOLOGY | Facility: CLINIC | Age: 53
End: 2023-04-07

## 2023-04-07 VITALS
HEART RATE: 84 BPM | HEIGHT: 68 IN | BODY MASS INDEX: 41.56 KG/M2 | DIASTOLIC BLOOD PRESSURE: 58 MMHG | SYSTOLIC BLOOD PRESSURE: 124 MMHG | WEIGHT: 274.2 LBS

## 2023-04-07 DIAGNOSIS — M79.671 RIGHT FOOT PAIN: Primary | ICD-10-CM

## 2023-04-07 DIAGNOSIS — M14.671 CHARCOT'S JOINT, RIGHT ANKLE AND FOOT: ICD-10-CM

## 2023-04-07 DIAGNOSIS — E11.49 OTHER DIABETIC NEUROLOGICAL COMPLICATION ASSOCIATED WITH TYPE 2 DIABETES MELLITUS (HCC): ICD-10-CM

## 2023-04-07 DIAGNOSIS — M79.671 RIGHT FOOT PAIN: ICD-10-CM

## 2023-04-07 PROBLEM — E11.40 DIABETIC NEUROPATHY (HCC): Status: ACTIVE | Noted: 2023-04-07

## 2023-04-07 RX ORDER — FENOFIBRATE 145 MG/1
145 TABLET, COATED ORAL DAILY
COMMUNITY
Start: 2023-03-06

## 2023-04-07 NOTE — LETTER
April 7, 2023     Patient: Randy Ward  YOB: 1970  Date of Visit: 4/7/2023      To Whom it May Concern:    Alfredito Mike is under my professional care  Beatris Rosenberg was seen in my office on 4/7/2023  If you have any questions or concerns, please don't hesitate to call           Sincerely,          Sylvie Westbrook DPM        CC: No Recipients

## 2023-04-07 NOTE — PROGRESS NOTES
Assessment/Plan:    No problem-specific Assessment & Plan notes found for this encounter  Diagnoses and all orders for this visit:    Right foot pain  -     X-ray foot right 3+ views; Future  -     Durable Medical Equipment    Charcot's joint, right ankle and foot  -     Durable Medical Equipment  -     Sedimentation rate, automated; Future  -     C-reactive protein; Future  -     CBC and differential; Future  -     Hemoglobin A1C; Future  -     Basic metabolic panel; Future    Other diabetic neurological complication associated with type 2 diabetes mellitus ([facility] [address] )  -     Durable Medical Equipment  -     Sedimentation rate, automated; Future  -     C-reactive protein; Future  -     CBC and differential; Future  -     Hemoglobin A1C; Future  -     Basic metabolic panel; Future    Other orders  -     fenofibrate (TRICOR) 145 mg tablet; Take 145 mg by mouth daily  -     metoprolol tartrate (LOPRESSOR) 25 mg tablet; Every 12 hours        -  Patient was counseled and educated on the condition and the diagnosis   The diagnosis, treatment options and prognosis were discussed in detail    - Right foot xrays reviewed, I personally interpreted findings with patient which is consistent with midfoot and rearfoot and ankle Charcot arthropathy in the setting of type 2 uncontrolled DM  - Patient presents with rocker bottom Stage 2/3 right charcot foot In the setting of uncontrolled type 2 DM with history of high A1c  Patient was educated on charcot neuroarthropathy and potential complications which could could lead to ulcerations, infections and risk of limb loss  I discussed with him immediate immobilization of right lower extremity with Cam boot vs TCC  Rx DME Crow boot to be fitted  Meantime I Will obtain ESR CRP and CBC, BMP, A1c  I also explained to patient given poorly-controlled diabetes with most recent A1c of 11 3 2/2021 he needs to follow up with PCP for diabetes and medical management   Thankfully, currently patient does not exhibit ulcerations to right foot however, if he continues to walk without assistive devises he will surely develop ulcers in less then 1 year  Chriss Palomo will need charcot foot reconstruction once A1c is less then 8% to obtain stable platigrade foot  - Discussed he will need to make job modification and sedentary work is preferred to reduce overload to the right foot   - Return in 2-4 weeks, Rx CROW boot   - Addressed all questions and concerns       Lab Results   Component Value Date    HGBA1C 11 3 (H) 02/18/2021       Subjective:      Patient ID: Cheril Dandy is a 47 y o  male  58-year-old male with past medical history of uncontrolled diabetes, coronary artery disease, hypertension presents for an evaluation of right foot pain and deformity  Patient reports about 2 years ago he saw a foot specialist who told him he has arthritic changes to his foot  Patient reports the deformity along with swelling has been increasing since a year now  He has pain along the midfoot and rear foot and ankle region medially  Reports the pain is usually with ambulation and standing and walking  Patient is a  who is on his feet constantly  He does not recall the last time he saw his PCP  Patient reports he takes insulin  He does not know his recent A1c  No other complaints at this time  He denies nausea vomit fever chills shortness of breath  The following portions of the patient's history were reviewed and updated as appropriate:   He  has a past medical history of Coronary artery disease, DM (diabetes mellitus), type 2 ([facility] [address] ), and Hypertension    He   Patient Active Problem List    Diagnosis Date Noted   • Diabetic neuropathy ([facility] [address] ) 04/07/2023   • Right foot pain 04/07/2023   • Charcot's joint, right ankle and foot 04/07/2023   • Strain of right biceps 12/09/2021   • Strain of  right extensor muscle at forearm level 12/09/2021     He  has a past surgical history that includes Cardiac "surgery and Coronary artery bypass graft (Right, 11/28/2021)       Review of Systems   Constitutional: Negative for chills  Gastrointestinal: Negative for nausea and vomiting  Musculoskeletal: Positive for arthralgias  Neurological: Negative for dizziness  Psychiatric/Behavioral: Negative for agitation  Objective:      /58 (BP Location: Right arm, Patient Position: Sitting, Cuff Size: Large)   Pulse 84   Ht 5' 8\" (1 727 m)   Wt 124 kg (274 lb 3 2 oz)   BMI 41 69 kg/m²          Physical Exam  Vitals reviewed  Cardiovascular:      Pulses: Pulses are weak  Dorsalis pedis pulses are 1+ on the right side and 1+ on the left side  Posterior tibial pulses are 0 on the right side and 1+ on the left side  Musculoskeletal:      Right foot: Charcot foot and tenderness present  Comments: Right rocker-bottom Charcot foot deformity noted  No range of motion noted throughout midfoot or rear foot  Pain with palpation along the medial aspect as well as lateral aspect of the midfoot and the rear foot  Pain with palpation to the posterior tibial tendon and peroneal tendon region  No open ulcers noted  Palpable pedal pulses DP bilaterally  Unable to palpate posterior tibial on the right  Diminished protective sensation  Feet:      Right foot:      Skin integrity: Warmth present  Neurological:      Mental Status: He is alert  Diabetic Foot Exam    Patient's shoes and socks removed  Right Foot/Ankle   Right Foot Inspection  Skin Exam: warmth  Skin not intact  Toe Exam: right toe deformity  Sensory   Vibration: absent  Proprioception: absent  Monofilament testing: absent    Vascular  The right DP pulse is 1+  The right PT pulse is 0  Left Foot/Ankle  Left Foot Inspection  Skin Exam: Skin not intact  Sensory   Vibration: absent  Proprioception: absent  Monofilament testing: absent    Vascular  The left DP pulse is 1+  The left PT pulse is 1+       Assign " Risk Category  Deformity present  Loss of protective sensation  Weak pulses  Risk: 3

## 2023-05-17 ENCOUNTER — OFFICE VISIT (OUTPATIENT)
Dept: PODIATRY | Facility: CLINIC | Age: 53
End: 2023-05-17

## 2023-05-17 ENCOUNTER — TELEPHONE (OUTPATIENT)
Dept: OBGYN CLINIC | Facility: HOSPITAL | Age: 53
End: 2023-05-17

## 2023-05-17 VITALS
WEIGHT: 274 LBS | HEIGHT: 68 IN | SYSTOLIC BLOOD PRESSURE: 146 MMHG | BODY MASS INDEX: 41.52 KG/M2 | DIASTOLIC BLOOD PRESSURE: 64 MMHG | OXYGEN SATURATION: 96 % | HEART RATE: 92 BPM

## 2023-05-17 DIAGNOSIS — E11.49 OTHER DIABETIC NEUROLOGICAL COMPLICATION ASSOCIATED WITH TYPE 2 DIABETES MELLITUS (HCC): ICD-10-CM

## 2023-05-17 DIAGNOSIS — M25.571 CHRONIC PAIN OF RIGHT ANKLE: Primary | ICD-10-CM

## 2023-05-17 DIAGNOSIS — G89.29 CHRONIC PAIN OF RIGHT ANKLE: Primary | ICD-10-CM

## 2023-05-17 DIAGNOSIS — M14.671 CHARCOT'S JOINT, RIGHT ANKLE AND FOOT: ICD-10-CM

## 2023-05-17 NOTE — TELEPHONE ENCOUNTER
Caller: Jose    Doctor: [id number]    Reason for call: transferred call to Podiatry patient was supposed to have a f/u appt around 04/28    Call back#: [phone]

## 2023-05-18 NOTE — PROGRESS NOTES
Assessment/Plan:    No problem-specific Assessment & Plan notes found for this encounter  Diagnoses and all orders for this visit:    Chronic pain of right ankle    Charcot's joint, right ankle and foot    Other diabetic neurological complication associated with type 2 diabetes mellitus ([facility] [address] )      Plan:    -  Patient was counseled and educated on the condition and the diagnosis   The diagnosis, treatment options and prognosis were discussed in detail    - Patient presents with rocker bottom Stage 2/3 right charcot foot In the setting of uncontrolled type 2 DM with history of high A1c  Patient was educated on charcot neuroarthropathy and potential complications which could lead to ulcerations, infections and risk of limb loss  I discussed with him again of immediate immobilization of right lower extremity with Cam boot vs TCC  Rx DME Crow boot to be fitted  He has not yet went to have blood work done or stayed off of his foot  I stressed importance of finding a sedentary desk type job and quitting current job as a  as this is only causing worsening of his Charcot foot deformity  I also informed patient he needs to have his blood work done as well as make an appointment to see PCP sooner than later  At this time I will have my office donate a cam boot to immobilize him immediately  Splane to him he cannot drive with medical device on  Thankfully, currently patient does not exhibit ulcerations to right foot/ankle however, if he continues to walk without assistive devises he will surely develop ulcers in less then 1 year  [facility] will need charcot foot reconstruction once A1c is less then 8% to obtain stable platigrade foot    Patient will follow-up with Dr Familia Monahan for possible Charcot foot reconstruction     - Discussed he will need to make job modification and sedentary work is preferred to reduce overload on the right foot/ankle   - Rx CROW boot   - Addressed all questions and concerns "      Subjective:      Patient ID: Irene Abarca is a 47 y o  male  65-year-old male with past medical history of uncontrolled diabetes and has not seen PCP with over a year or 2 presents for continued treatment of right foot and ankle pain  Patient's concern still remains the pain that he has discomfort with walking but deform foot  Reports to me the deformity seems to be getting worse with his foot now turning inward  He continues to work as a  and applies full pressure on the right lower extremity  Not have blood work done as recommended or followed up with PCP  Did not go to [place] [id number] clinic to have his lower extremity measured for [place]  No Other complaints  The following portions of the patient's history were reviewed and updated as appropriate:   He  has a past medical history of Coronary artery disease, DM (diabetes mellitus), type 2 ([facility] [address] ), and Hypertension  He   Patient Active Problem List    Diagnosis Date Noted   • Diabetic neuropathy ([facility] [address] ) 04/07/2023   • Right foot pain 04/07/2023   • Charcot's joint, right ankle and foot 04/07/2023   • Strain of right biceps 12/09/2021   • Strain of  right extensor muscle at forearm level 12/09/2021     He  has a past surgical history that includes Cardiac surgery and Coronary artery bypass graft (Right, 11/28/2021)       Review of Systems   All other systems reviewed and are negative  Objective:      /64 (BP Location: Left arm, Patient Position: Sitting, Cuff Size: Large)   Pulse 92   Ht 5' 8\" (1 727 m)   Wt 124 kg (274 lb)   SpO2 96%   BMI 41 66 kg/m²          Physical Exam  Vitals reviewed  Musculoskeletal:      Right foot: Charcot foot present  Comments: Right rocker-bottom Charcot foot deformity noted  No range of motion noted throughout midfoot or rear foot and ankle  Pain with palpation along the medial aspect as well as lateral aspect of the ankle and rear foot    Pain with palpation to the posterior tibial " tendon and peroneal tendon region  No open ulcers noted  Palpable pedal pulses DP bilaterally  Unable to palpate posterior tibial on the right  Diminished protective sensation

## 2023-05-19 ENCOUNTER — APPOINTMENT (OUTPATIENT)
Dept: LAB | Facility: CLINIC | Age: 53
End: 2023-05-19

## 2023-05-19 DIAGNOSIS — E11.49 OTHER DIABETIC NEUROLOGICAL COMPLICATION ASSOCIATED WITH TYPE 2 DIABETES MELLITUS (HCC): ICD-10-CM

## 2023-05-19 DIAGNOSIS — M14.671 CHARCOT'S JOINT, RIGHT ANKLE AND FOOT: ICD-10-CM

## 2023-05-19 LAB
ANION GAP SERPL CALCULATED.3IONS-SCNC: 7 MMOL/L (ref 4–13)
BASOPHILS # BLD AUTO: 0.04 THOUSANDS/ÂΜL (ref 0–0.1)
BASOPHILS NFR BLD AUTO: 1 % (ref 0–1)
BUN SERPL-MCNC: 15 MG/DL (ref 5–25)
CALCIUM SERPL-MCNC: 9.3 MG/DL (ref 8.4–10.2)
CHLORIDE SERPL-SCNC: 102 MMOL/L (ref 96–108)
CO2 SERPL-SCNC: 30 MMOL/L (ref 21–32)
CREAT SERPL-MCNC: 0.85 MG/DL (ref 0.6–1.3)
CRP SERPL QL: 12.2 MG/L
EOSINOPHIL # BLD AUTO: 0.29 THOUSAND/ÂΜL (ref 0–0.61)
EOSINOPHIL NFR BLD AUTO: 5 % (ref 0–6)
ERYTHROCYTE [DISTWIDTH] IN BLOOD BY AUTOMATED COUNT: 12.8 % (ref 11.6–15.1)
ERYTHROCYTE [SEDIMENTATION RATE] IN BLOOD: 26 MM/HOUR (ref 0–19)
EST. AVERAGE GLUCOSE BLD GHB EST-MCNC: 232 MG/DL
GFR SERPL CREATININE-BSD FRML MDRD: 99 ML/MIN/1.73SQ M
GLUCOSE P FAST SERPL-MCNC: 154 MG/DL (ref 65–99)
HBA1C MFR BLD: 9.7 %
HCT VFR BLD AUTO: 43.9 % (ref 36.5–49.3)
HGB BLD-MCNC: 14 G/DL (ref 12–17)
IMM GRANULOCYTES # BLD AUTO: 0.02 THOUSAND/UL (ref 0–0.2)
IMM GRANULOCYTES NFR BLD AUTO: 0 % (ref 0–2)
LYMPHOCYTES # BLD AUTO: 1.95 THOUSANDS/ÂΜL (ref 0.6–4.47)
LYMPHOCYTES NFR BLD AUTO: 32 % (ref 14–44)
MCH RBC QN AUTO: 30.6 PG (ref 26.8–34.3)
MCHC RBC AUTO-ENTMCNC: 31.9 G/DL (ref 31.4–37.4)
MCV RBC AUTO: 96 FL (ref 82–98)
MONOCYTES # BLD AUTO: 0.43 THOUSAND/ÂΜL (ref 0.17–1.22)
MONOCYTES NFR BLD AUTO: 7 % (ref 4–12)
NEUTROPHILS # BLD AUTO: 3.28 THOUSANDS/ÂΜL (ref 1.85–7.62)
NEUTS SEG NFR BLD AUTO: 55 % (ref 43–75)
NRBC BLD AUTO-RTO: 0 /100 WBCS
PLATELET # BLD AUTO: 301 THOUSANDS/UL (ref 149–390)
PMV BLD AUTO: 9.7 FL (ref 8.9–12.7)
POTASSIUM SERPL-SCNC: 4 MMOL/L (ref 3.5–5.3)
RBC # BLD AUTO: 4.57 MILLION/UL (ref 3.88–5.62)
SODIUM SERPL-SCNC: 139 MMOL/L (ref 135–147)
WBC # BLD AUTO: 6.01 THOUSAND/UL (ref 4.31–10.16)

## 2023-06-14 ENCOUNTER — CONSULT (OUTPATIENT)
Dept: PODIATRY | Facility: CLINIC | Age: 53
End: 2023-06-14

## 2023-06-14 VITALS
DIASTOLIC BLOOD PRESSURE: 88 MMHG | BODY MASS INDEX: 41.43 KG/M2 | HEART RATE: 78 BPM | WEIGHT: 273.4 LBS | SYSTOLIC BLOOD PRESSURE: 151 MMHG | HEIGHT: 68 IN

## 2023-06-14 DIAGNOSIS — M14.671 CHARCOT'S JOINT, RIGHT ANKLE AND FOOT: ICD-10-CM

## 2023-06-14 DIAGNOSIS — E11.42 DIABETIC POLYNEUROPATHY ASSOCIATED WITH TYPE 2 DIABETES MELLITUS (HCC): Primary | ICD-10-CM

## 2023-06-14 PROCEDURE — 99214 OFFICE O/P EST MOD 30 MIN: CPT | Performed by: PODIATRIST

## 2023-06-14 RX ORDER — ATORVASTATIN CALCIUM 80 MG/1
TABLET, FILM COATED ORAL DAILY
COMMUNITY

## 2023-06-14 NOTE — PROGRESS NOTES
"This patient was seen on 6/14/23  My role is Foot , Ankle, and Wound Specialist    SUBJECTIVE    Chief Complaint:  Charcot foot     Patient ID: Mae Voss is a 47 y o  male  Charol Oven is here for an opinion and recommendations regarding his Charcot foot  He was referred here by Dr Silvana Espinal  He was diagnosed with a Charcot foot by her and given a prescription for a CROW boot, fitted into a camboot, and told to be strictly NWB on crutches  Also he was ordered to get off of the  job  He has since applied for a leave of absence  Unfortunately, he presents today FWB on the foot without his camboot  He has his crutches but really doesn't use them in an significant way to offload the foot  He hasn't seen Dr Yahaira Escobar in 4 months he states  His blood sugars are pretty non-controlled and his last A1C was a 9 7% in May  The following portions of the patient's history were reviewed and updated as appropriate: allergies, current medications, past family history, past medical history, past social history, past surgical history and problem list     Review of Systems   Constitutional: Positive for activity change  Negative for appetite change, chills, diaphoresis, fatigue, fever and unexpected weight change  Respiratory: Negative  Musculoskeletal: Positive for arthralgias, gait problem and joint swelling  Neurological: Positive for numbness  OBJECTIVE      /88   Pulse 78   Ht 5' 8\" (1 727 m)   Wt 124 kg (273 lb 6 4 oz)   BMI 41 57 kg/m²     Foot/Ankle Musculoskeletal Exam    Neurovascular    Right      Dorsalis pedis: 2+      Posterior tibial: 2+    Left      Dorsalis pedis: 2+      Posterior tibial: 2+    General    Neurological: alert  General additional comments: The foot is grossly edematous on the Right  It is warm, red and has a pronounced varus deformity  There is a pronounced gastrosoleal equinus   Bilateral infrared cutaneous thermistor measurements were obtained " after allowing his skin to equilibrate to room temperature air for 15 minutes after cast boot and shoe removal  10 sites were tested on the acute Charcot foot and the warmest site was selected for comparison to the contralateral foot  The differential is noted to be 8 degrees Fahrenheit  Physical Exam  Vitals and nursing note reviewed  Constitutional:       General: He is not in acute distress  Appearance: Normal appearance  He is obese  He is not ill-appearing, toxic-appearing or diaphoretic  HENT:      Head: Normocephalic  Cardiovascular:      Pulses:           Dorsalis pedis pulses are 2+ on the right side and 2+ on the left side  Posterior tibial pulses are 2+ on the right side and 2+ on the left side  Pulmonary:      Effort: Pulmonary effort is normal    Musculoskeletal:         General: Swelling and deformity present  Right lower leg: Edema present  Comments: The foot is grossly edematous on the Right  It is warm, red and has a pronounced varus deformity  There is a pronounced gastrosoleal equinus  Bilateral infrared cutaneous thermistor measurements were obtained after allowing his skin to equilibrate to room temperature air for 15 minutes after cast boot and shoe removal  10 sites were tested on the acute Charcot foot and the warmest site was selected for comparison to the contralateral foot  The differential is noted to be 8 degrees Fahrenheit  Feet:      Right foot:      Protective Sensation: 10 sites tested  0 sites sensed  Left foot:      Protective Sensation: 10 sites tested  0 sites sensed  Skin:     Capillary Refill: Capillary refill takes less than 2 seconds  Findings: Erythema present  Neurological:      Mental Status: He is alert               ASSESSMENT     Diagnoses and all orders for this visit:    Diabetic polyneuropathy associated with type 2 diabetes mellitus ([facility] [address] )    Charcot's joint, right ankle and foot    Other orders  -     atorvastatin (LIPITOR) 80 mg tablet; Daily  -     metFORMIN (GLUCOPHAGE) 1000 MG tablet; Every 12 hours         Problem List Items Addressed This Visit        Endocrine    Diabetic neuropathy ([address] ) - Primary    Relevant Medications    metFORMIN (GLUCOPHAGE) 1000 MG tablet       Musculoskeletal and Integument    Charcot's joint, right ankle and foot           PLAN    I personally evaluated his 4/7/23 xrays RLE noting dissolution of the talus, with the tibia articulating with the lateral calcaneous  The entire midfoot is degenerative in keeping with neuroarthropathy  I had a very ngoc conversation with Jose  I explained that his current uncontrolled diabetes precludes surgery and that he would need to get his A1C < 8% to be within guidelines  Moreover, his non-compliance with immobilization (camboot) and NWB (crutches) are going to worsen his foot deformity  I explained that if this continues his foot will become unsalvageable and eventual below knee amputation will become inevitable  I suggested the following treatment plan:    1  IMMEDIATELY cease all weightbearing on the RLE using crutches and shower chair  2  Use the camboot at all times to immobilize foot except when driving and showering  3  Make an appointment ASAP with Dr Karimi Letters to come up with a weight loss and glucose control program to try to get him below A1C 8% so we can consider surgery  4  Once he achieves #3; he will require a tibiocalcaneal fusion with intramedullary nail followed by a 3 month period of post-op NWB in an attempt to salvage the foot  I'm going to see him back here in 6 weeks to reassess and take new xrays

## 2023-08-08 ENCOUNTER — OFFICE VISIT (OUTPATIENT)
Dept: PODIATRY | Facility: CLINIC | Age: 53
End: 2023-08-08

## 2023-08-08 VITALS
DIASTOLIC BLOOD PRESSURE: 84 MMHG | HEART RATE: 78 BPM | WEIGHT: 279 LBS | BODY MASS INDEX: 42.28 KG/M2 | HEIGHT: 68 IN | SYSTOLIC BLOOD PRESSURE: 154 MMHG

## 2023-08-08 DIAGNOSIS — M14.671 CHARCOT'S JOINT, RIGHT ANKLE AND FOOT: ICD-10-CM

## 2023-08-08 DIAGNOSIS — E11.42 DIABETIC POLYNEUROPATHY ASSOCIATED WITH TYPE 2 DIABETES MELLITUS (HCC): Primary | ICD-10-CM

## 2023-08-08 PROCEDURE — 99214 OFFICE O/P EST MOD 30 MIN: CPT | Performed by: PODIATRIST

## 2023-08-08 NOTE — PROGRESS NOTES
This patient was seen on 8/8/23. My role is Foot , Ankle, and Wound Specialist    SUBJECTIVE    Chief Complaint:  Charcot ankle     Patient ID: Migel Jorge is a 47 y.o. male. Dharmesh Lei is here for  Management of his Charcot foot. He was referred here by Dr. Mariela Aguilera. He was diagnosed with a Charcot foot by her and given a prescription for a CROW boot, fitted into a camboot, and told to be strictly NWB on crutches. Also he was ordered to get off of the  job. He has since applied for a leave of absence. I saw him last time and ordered strict NWB with crutches and full time use of a camboot. Unfortunately, he presents today with a soft sneaker on the foot; no boot. He does have his crutches. He states when questioned why no boot and he states "I think the boot causes more harm than good to the foot". He saw Dr. Ad Joseph a few weeks ago and was started on Metformin. His blood sugars are pretty non-controlled and his last A1C was a 9.7% in May. The following portions of the patient's history were reviewed and updated as appropriate: allergies, current medications, past family history, past medical history, past social history, past surgical history and problem list.    Review of Systems   Constitutional: Positive for activity change. Negative for appetite change, chills, diaphoresis, fatigue, fever and unexpected weight change. Respiratory: Negative. Musculoskeletal: Positive for arthralgias, gait problem and joint swelling. Neurological: Positive for numbness. OBJECTIVE      /84 Comment: left arm  Pulse 78   Ht 5' 8" (1.727 m)   Wt 127 kg (279 lb)   BMI 42.42 kg/m²     Foot/Ankle Musculoskeletal Exam    Neurovascular    Right      Dorsalis pedis: 2+      Posterior tibial: 2+    Left      Dorsalis pedis: 2+      Posterior tibial: 2+    General    Neurological: alert  General additional comments: The foot is grossly edematous on the Right.  It is warm, red and has a pronounced varus deformity. There is a pronounced gastrosoleal equinus. Bilateral infrared cutaneous thermistor measurements were obtained after allowing his skin to equilibrate to room temperature air for 15 minutes after cast boot and shoe removal. 10 sites were tested on the acute Charcot foot and the warmest site was selected for comparison to the contralateral foot. The differential is noted to be 3 degrees Fahrenheit. Physical Exam  Vitals and nursing note reviewed. Constitutional:       General: He is not in acute distress. Appearance: Normal appearance. He is obese. He is not ill-appearing, toxic-appearing or diaphoretic. HENT:      Head: Normocephalic. Cardiovascular:      Pulses:           Dorsalis pedis pulses are 2+ on the right side and 2+ on the left side. Posterior tibial pulses are 2+ on the right side and 2+ on the left side. Pulmonary:      Effort: Pulmonary effort is normal.   Musculoskeletal:         General: Swelling and deformity present. Right lower leg: Edema present. Comments: The foot is grossly edematous on the Right. It is warm, red and has a pronounced varus deformity. There is a pronounced gastrosoleal equinus. Bilateral infrared cutaneous thermistor measurements were obtained after allowing his skin to equilibrate to room temperature air for 15 minutes after cast boot and shoe removal. 10 sites were tested on the acute Charcot foot and the warmest site was selected for comparison to the contralateral foot. The differential is noted to be 3 degrees Fahrenheit. Feet:      Right foot:      Protective Sensation: 10 sites tested. 0 sites sensed. Left foot:      Protective Sensation: 10 sites tested. 0 sites sensed. Skin:     Capillary Refill: Capillary refill takes less than 2 seconds. Findings: Erythema present. Neurological:      Mental Status: He is alert.              ASSESSMENT     Diagnoses and all orders for this visit:    Diabetic polyneuropathy associated with type 2 diabetes mellitus (HCC)    Charcot's joint, right ankle and foot         Problem List Items Addressed This Visit        Endocrine    Diabetic neuropathy ([address]) - Primary       Musculoskeletal and Integument    Charcot's joint, right ankle and foot           PLAN    Once again, I ordered strict NWB and full time immobilization with the boot. I explained that until his A1C gets below 8 and his edema resolves he will not be a candidate for surgery. The two options he has are either elective below knee amputation (now) or an attempt at limb salvage with a tibiotalocalcaneal fusion with intramedullary ifrah in the future once his edema and glucose are under control. I told him to continue follow-up with Dr. Kenny Pruitt to get the sugars down. I'll see him back in a month.

## 2025-01-21 ENCOUNTER — OFFICE VISIT (OUTPATIENT)
Dept: DENTISTRY | Facility: CLINIC | Age: 55
End: 2025-01-21

## 2025-01-21 VITALS — SYSTOLIC BLOOD PRESSURE: 162 MMHG | HEART RATE: 80 BPM | DIASTOLIC BLOOD PRESSURE: 85 MMHG

## 2025-01-21 DIAGNOSIS — K04.7 TOOTH ABSCESS: Primary | ICD-10-CM

## 2025-01-21 PROCEDURE — D0330 PANORAMIC RADIOGRAPHIC IMAGE: HCPCS

## 2025-01-21 PROCEDURE — D0140 LIMITED ORAL EVALUATION - PROBLEM FOCUSED: HCPCS

## 2025-01-21 RX ORDER — CLINDAMYCIN HYDROCHLORIDE 300 MG/1
300 CAPSULE ORAL 4 TIMES DAILY
Qty: 20 CAPSULE | Refills: 0 | Status: CANCELLED | OUTPATIENT
Start: 2025-01-21 | End: 2025-01-26

## 2025-01-21 RX ORDER — CLINDAMYCIN HYDROCHLORIDE 300 MG/1
300 CAPSULE ORAL 4 TIMES DAILY
Qty: 20 CAPSULE | Refills: 0 | Status: SHIPPED | OUTPATIENT
Start: 2025-01-21 | End: 2025-01-26

## 2025-01-21 NOTE — PROGRESS NOTES
"Procedure Details   - LIMITED ORAL EVALUATION - PROBLEM FOCUSED   - PANORAMIC RADIOGRAPHIC IMAGE    Limited Exam    Hussein Gross 54 y.o. male presents with self to [facility] for Limited exam  PMH reviewed, no changes, ASA II. Significant medical history: Reviewed with pt. Significant allergies: Penicillin allergy. Significant medications: Reviewed with pt.    Chief complaint:  \"I am having pain on my lower left tooth. I think it is the tooth all the way in the back.\"    Consent:  Discussed that limited exam focuses on problem area, and same day tx is not guaranteed.  Patient explained to if they wish to have anything else evaluated, they need to return to the practice at which they are a patient of record or schedule a comprehensive exam afterwards.  Patient understands and consent was given by self via verbal consent.    Subjective history:    Onset: 2 weeks ago off and on.   Provocation: Spontaneous.   Quality: Throbbing.   Region: Patient points to tooth #17 .   Severity: 6/10.   Timing: constant.    Objective clinical findings:   Oral cancer screening: normal.   Extraoral exam: mild extraoral swelling at left angle of mandible. No lymphadenopathy. No tmj associated symptoms.   Intraoral exam: significant sized caries #17-MODBL with pulpal involvement. Fracture of crown #2, 15, 16 with root tips remaining. Mild swelling of buccal of #17.      Radiographs: PAN.    Assessment:  Non-restorable dentition #1, 15, 16, and 17.     Plan:   [facility] for ext #1, 15, 16, and 17 due to proximity to LARRY of #17 with curved roots.    Referral(s): [facility] for ext #1, 15, 16, and 17 .  Rx: Clindamycin.  Comprehensive care disposition: Patient expressed interest in becoming a patient of record with one of the  dental clinics.    Patient dismissed ambulatory and alert.    NV: Comp exam and fmx.    Attending: Dr. Malhotra checked x-ray .  "

## 2025-01-24 ENCOUNTER — TELEPHONE (OUTPATIENT)
Dept: DENTISTRY | Facility: CLINIC | Age: 55
End: 2025-01-24

## 2025-02-12 ENCOUNTER — OFFICE VISIT (OUTPATIENT)
Dept: DENTISTRY | Facility: CLINIC | Age: 55
End: 2025-02-12

## 2025-02-12 DIAGNOSIS — Z01.20 ENCOUNTER FOR DENTAL EXAMINATION: Primary | ICD-10-CM

## 2025-02-12 PROBLEM — I48.3 TYPICAL ATRIAL FLUTTER (HCC): Status: ACTIVE | Noted: 2024-10-30

## 2025-02-12 PROBLEM — M66.369 NON-TRAUMATIC RUPTURE OF ACHILLES TENDON: Status: ACTIVE | Noted: 2018-07-30

## 2025-02-12 PROBLEM — Z01.818 PRE-OP EVALUATION: Status: ACTIVE | Noted: 2018-08-13

## 2025-02-12 PROBLEM — E66.01 MORBID OBESITY (HCC): Status: ACTIVE | Noted: 2017-05-22

## 2025-02-12 PROBLEM — R74.01 ELEVATED TRANSAMINASE LEVEL: Status: ACTIVE | Noted: 2019-10-25

## 2025-02-12 PROCEDURE — D0210 INTRAORAL - COMPLETE SERIES OF RADIOGRAPHIC IMAGES: HCPCS | Performed by: DENTIST

## 2025-02-12 PROCEDURE — D0150 COMPREHENSIVE ORAL EVALUATION - NEW OR ESTABLISHED PATIENT: HCPCS | Performed by: DENTIST

## 2025-02-12 NOTE — PROGRESS NOTES
Procedure Details   - COMPREHENSIVE ORAL EVALUATION - NEW OR ESTABLISHED PATIENT     COMP EXAM, FMX, PROBE EXAM   REVIEWED MED HX: meds, allergies, health changes reviewed in EPIC  CHIEF CONCERN:     -Last dental visit was approx 20 yrs ago  PAIN SCALE:  0  ASA CLASS:  ASA 3 - Patient with moderate systemic disease with functional limitations  PLAQUE:  moderate  CALCULUS: Light  BLEEDING:  light  STAIN :  None  PERIO:  FMP completed, due for Adult Prophy    Visual and Tactile Intraoral/ Extraoral evaluation: Oral and Oropharyngeal cancer evaluation. No findings     Reviewed with patient clinical and radiographic findings and patient verbalized understanding. All questions and concerns addressed.     REFERRALS: None    CARIES FINDINGS: None.       NEXT VISIT:   1) Initial prophy w/ hygiene    Last FMX : Today - 02/12/2025   - INTRAORAL - COMPLETE SERIES OF RADIOGRAPHIC IMAGES  See Exam Note

## 2025-02-12 NOTE — DENTAL PROCEDURE DETAILS
COMP EXAM, FMX, PROBE EXAM   REVIEWED MED HX: meds, allergies, health changes reviewed in EPIC  CHIEF CONCERN:     -Last dental visit was approx 20 yrs ago  PAIN SCALE:  0  ASA CLASS:  ASA 3 - Patient with moderate systemic disease with functional limitations  PLAQUE:  moderate  CALCULUS: Light  BLEEDING:  light  STAIN :  None  PERIO:  FMP completed, due for Adult Prophy    Visual and Tactile Intraoral/ Extraoral evaluation: Oral and Oropharyngeal cancer evaluation. No findings     Reviewed with patient clinical and radiographic findings and patient verbalized understanding. All questions and concerns addressed.     REFERRALS: None    CARIES FINDINGS: None.       NEXT VISIT:   1) Initial prophy w/ hygiene    Last FMX : Today - 02/12/2025